# Patient Record
Sex: FEMALE | Race: WHITE | Employment: UNEMPLOYED | URBAN - METROPOLITAN AREA
[De-identification: names, ages, dates, MRNs, and addresses within clinical notes are randomized per-mention and may not be internally consistent; named-entity substitution may affect disease eponyms.]

---

## 2021-02-04 ENCOUNTER — HOSPITAL ENCOUNTER (EMERGENCY)
Age: 36
Discharge: PSYCHIATRIC OR SUBSTANCE USE FACILITY | End: 2021-02-05
Attending: EMERGENCY MEDICINE
Payer: MEDICAID

## 2021-02-04 VITALS
HEART RATE: 124 BPM | OXYGEN SATURATION: 92 % | SYSTOLIC BLOOD PRESSURE: 120 MMHG | TEMPERATURE: 98.1 F | DIASTOLIC BLOOD PRESSURE: 68 MMHG | RESPIRATION RATE: 18 BRPM

## 2021-02-04 DIAGNOSIS — F23 ACUTE PSYCHOSIS (HCC): Primary | ICD-10-CM

## 2021-02-04 DIAGNOSIS — F22 PARANOID DELUSION (HCC): ICD-10-CM

## 2021-02-04 DIAGNOSIS — T74.21XA SEXUAL ASSAULT OF ADULT, INITIAL ENCOUNTER: ICD-10-CM

## 2021-02-04 PROBLEM — F29 PSYCHOSIS (HCC): Status: ACTIVE | Noted: 2021-02-04

## 2021-02-04 LAB
A/G RATIO: 1.5 (ref 1.1–2.2)
ACETAMINOPHEN LEVEL: <5 UG/ML (ref 10–30)
ALBUMIN SERPL-MCNC: 4.7 G/DL (ref 3.4–5)
ALP BLD-CCNC: 74 U/L (ref 40–129)
ALT SERPL-CCNC: 57 U/L (ref 10–40)
AMPHETAMINE SCREEN, URINE: POSITIVE
ANION GAP SERPL CALCULATED.3IONS-SCNC: 13 MMOL/L (ref 3–16)
AST SERPL-CCNC: 56 U/L (ref 15–37)
BARBITURATE SCREEN URINE: ABNORMAL
BASOPHILS ABSOLUTE: 0.1 K/UL (ref 0–0.2)
BASOPHILS RELATIVE PERCENT: 0.8 %
BENZODIAZEPINE SCREEN, URINE: ABNORMAL
BILIRUB SERPL-MCNC: 0.8 MG/DL (ref 0–1)
BILIRUBIN URINE: NEGATIVE
BLOOD, URINE: NEGATIVE
BUN BLDV-MCNC: 19 MG/DL (ref 7–20)
CALCIUM SERPL-MCNC: 9.9 MG/DL (ref 8.3–10.6)
CANNABINOID SCREEN URINE: POSITIVE
CHLORIDE BLD-SCNC: 103 MMOL/L (ref 99–110)
CLARITY: ABNORMAL
CO2: 23 MMOL/L (ref 21–32)
COCAINE METABOLITE SCREEN URINE: ABNORMAL
COLOR: ABNORMAL
CREAT SERPL-MCNC: 0.7 MG/DL (ref 0.6–1.1)
CRYSTALS, UA: ABNORMAL /HPF
EOSINOPHILS ABSOLUTE: 0 K/UL (ref 0–0.6)
EOSINOPHILS RELATIVE PERCENT: 0.4 %
EPITHELIAL CELLS, UA: 2 /HPF (ref 0–5)
ETHANOL: NORMAL MG/DL (ref 0–0.08)
FINE CASTS, UA: ABNORMAL /LPF (ref 0–2)
GFR AFRICAN AMERICAN: >60
GFR NON-AFRICAN AMERICAN: >60
GLOBULIN: 3.1 G/DL
GLUCOSE BLD-MCNC: 99 MG/DL (ref 70–99)
GLUCOSE URINE: NEGATIVE MG/DL
HCG QUALITATIVE: NEGATIVE
HCT VFR BLD CALC: 40.8 % (ref 36–48)
HEMOGLOBIN: 13.9 G/DL (ref 12–16)
HYALINE CASTS: ABNORMAL /LPF (ref 0–2)
KETONES, URINE: 40 MG/DL
LEUKOCYTE ESTERASE, URINE: NEGATIVE
LYMPHOCYTES ABSOLUTE: 1.9 K/UL (ref 1–5.1)
LYMPHOCYTES RELATIVE PERCENT: 21.9 %
Lab: ABNORMAL
MCH RBC QN AUTO: 29.2 PG (ref 26–34)
MCHC RBC AUTO-ENTMCNC: 34.1 G/DL (ref 31–36)
MCV RBC AUTO: 85.7 FL (ref 80–100)
METHADONE SCREEN, URINE: ABNORMAL
MICROSCOPIC EXAMINATION: YES
MONOCYTES ABSOLUTE: 0.9 K/UL (ref 0–1.3)
MONOCYTES RELATIVE PERCENT: 10.5 %
NEUTROPHILS ABSOLUTE: 5.7 K/UL (ref 1.7–7.7)
NEUTROPHILS RELATIVE PERCENT: 66.4 %
NITRITE, URINE: NEGATIVE
OPIATE SCREEN URINE: ABNORMAL
OXYCODONE URINE: ABNORMAL
PDW BLD-RTO: 13.6 % (ref 12.4–15.4)
PH UA: 5.5
PH UA: 5.5 (ref 5–8)
PHENCYCLIDINE SCREEN URINE: ABNORMAL
PLATELET # BLD: 214 K/UL (ref 135–450)
PMV BLD AUTO: 8.4 FL (ref 5–10.5)
POTASSIUM SERPL-SCNC: 4 MMOL/L (ref 3.5–5.1)
PROPOXYPHENE SCREEN: ABNORMAL
PROTEIN UA: 100 MG/DL
RBC # BLD: 4.76 M/UL (ref 4–5.2)
RBC UA: 3 /HPF (ref 0–4)
SALICYLATE, SERUM: <0.3 MG/DL (ref 15–30)
SARS-COV-2, NAAT: NOT DETECTED
SODIUM BLD-SCNC: 139 MMOL/L (ref 136–145)
SPECIFIC GRAVITY UA: 1.03 (ref 1–1.03)
TOTAL PROTEIN: 7.8 G/DL (ref 6.4–8.2)
URINE REFLEX TO CULTURE: ABNORMAL
URINE TYPE: ABNORMAL
UROBILINOGEN, URINE: 0.2 E.U./DL
WBC # BLD: 8.6 K/UL (ref 4–11)
WBC UA: 5 /HPF (ref 0–5)

## 2021-02-04 PROCEDURE — 93005 ELECTROCARDIOGRAM TRACING: CPT | Performed by: EMERGENCY MEDICINE

## 2021-02-04 PROCEDURE — 6360000002 HC RX W HCPCS: Performed by: EMERGENCY MEDICINE

## 2021-02-04 PROCEDURE — 6370000000 HC RX 637 (ALT 250 FOR IP): Performed by: EMERGENCY MEDICINE

## 2021-02-04 PROCEDURE — 80307 DRUG TEST PRSMV CHEM ANLYZR: CPT

## 2021-02-04 PROCEDURE — 2580000003 HC RX 258: Performed by: EMERGENCY MEDICINE

## 2021-02-04 PROCEDURE — 80053 COMPREHEN METABOLIC PANEL: CPT

## 2021-02-04 PROCEDURE — 99283 EMERGENCY DEPT VISIT LOW MDM: CPT

## 2021-02-04 PROCEDURE — 96372 THER/PROPH/DIAG INJ SC/IM: CPT

## 2021-02-04 PROCEDURE — 80179 DRUG ASSAY SALICYLATE: CPT

## 2021-02-04 PROCEDURE — U0002 COVID-19 LAB TEST NON-CDC: HCPCS

## 2021-02-04 PROCEDURE — 82077 ASSAY SPEC XCP UR&BREATH IA: CPT

## 2021-02-04 PROCEDURE — 81001 URINALYSIS AUTO W/SCOPE: CPT

## 2021-02-04 PROCEDURE — 85025 COMPLETE CBC W/AUTO DIFF WBC: CPT

## 2021-02-04 PROCEDURE — U0003 INFECTIOUS AGENT DETECTION BY NUCLEIC ACID (DNA OR RNA); SEVERE ACUTE RESPIRATORY SYNDROME CORONAVIRUS 2 (SARS-COV-2) (CORONAVIRUS DISEASE [COVID-19]), AMPLIFIED PROBE TECHNIQUE, MAKING USE OF HIGH THROUGHPUT TECHNOLOGIES AS DESCRIBED BY CMS-2020-01-R: HCPCS

## 2021-02-04 PROCEDURE — 36415 COLL VENOUS BLD VENIPUNCTURE: CPT

## 2021-02-04 PROCEDURE — 80143 DRUG ASSAY ACETAMINOPHEN: CPT

## 2021-02-04 PROCEDURE — 84703 CHORIONIC GONADOTROPIN ASSAY: CPT

## 2021-02-04 RX ORDER — LORAZEPAM 2 MG/ML
1 INJECTION INTRAMUSCULAR ONCE
Status: DISCONTINUED | OUTPATIENT
Start: 2021-02-04 | End: 2021-02-04

## 2021-02-04 RX ORDER — LORAZEPAM 2 MG/ML
2 INJECTION INTRAMUSCULAR ONCE
Status: COMPLETED | OUTPATIENT
Start: 2021-02-04 | End: 2021-02-04

## 2021-02-04 RX ORDER — ONDANSETRON 4 MG/1
4 TABLET, ORALLY DISINTEGRATING ORAL ONCE
Status: COMPLETED | OUTPATIENT
Start: 2021-02-04 | End: 2021-02-04

## 2021-02-04 RX ORDER — CEFTRIAXONE 1 G/1
500 INJECTION, POWDER, FOR SOLUTION INTRAMUSCULAR; INTRAVENOUS ONCE
Status: DISCONTINUED | OUTPATIENT
Start: 2021-02-04 | End: 2021-02-05 | Stop reason: HOSPADM

## 2021-02-04 RX ORDER — HALOPERIDOL 5 MG/ML
5 INJECTION INTRAMUSCULAR ONCE
Status: COMPLETED | OUTPATIENT
Start: 2021-02-04 | End: 2021-02-05

## 2021-02-04 RX ORDER — ZIPRASIDONE MESYLATE 20 MG/ML
20 INJECTION, POWDER, LYOPHILIZED, FOR SOLUTION INTRAMUSCULAR ONCE
Status: COMPLETED | OUTPATIENT
Start: 2021-02-04 | End: 2021-02-04

## 2021-02-04 RX ORDER — LORAZEPAM 2 MG/ML
1 INJECTION INTRAMUSCULAR ONCE
Status: COMPLETED | OUTPATIENT
Start: 2021-02-04 | End: 2021-02-04

## 2021-02-04 RX ORDER — 0.9 % SODIUM CHLORIDE 0.9 %
1000 INTRAVENOUS SOLUTION INTRAVENOUS ONCE
Status: COMPLETED | OUTPATIENT
Start: 2021-02-04 | End: 2021-02-04

## 2021-02-04 RX ORDER — LEVONORGESTREL 1.5 MG/1
1.5 TABLET ORAL ONCE
Status: COMPLETED | OUTPATIENT
Start: 2021-02-04 | End: 2021-02-04

## 2021-02-04 RX ORDER — AZITHROMYCIN 250 MG/1
1000 TABLET, FILM COATED ORAL ONCE
Status: COMPLETED | OUTPATIENT
Start: 2021-02-04 | End: 2021-02-04

## 2021-02-04 RX ADMIN — SODIUM CHLORIDE 1000 ML: 9 INJECTION, SOLUTION INTRAVENOUS at 12:30

## 2021-02-04 RX ADMIN — LEVONORGESTREL 1.5 MG: 1.5 TABLET ORAL at 22:20

## 2021-02-04 RX ADMIN — LORAZEPAM 2 MG: 2 INJECTION, SOLUTION INTRAMUSCULAR; INTRAVENOUS at 23:53

## 2021-02-04 RX ADMIN — SODIUM CHLORIDE 1000 ML: 9 INJECTION, SOLUTION INTRAVENOUS at 13:15

## 2021-02-04 RX ADMIN — AZITHROMYCIN MONOHYDRATE 500 MG: 250 TABLET ORAL at 22:19

## 2021-02-04 RX ADMIN — LORAZEPAM 1 MG: 2 INJECTION, SOLUTION INTRAMUSCULAR; INTRAVENOUS at 12:30

## 2021-02-04 RX ADMIN — ONDANSETRON 4 MG: 4 TABLET, ORALLY DISINTEGRATING ORAL at 22:20

## 2021-02-04 RX ADMIN — LORAZEPAM 1 MG: 2 INJECTION, SOLUTION INTRAMUSCULAR; INTRAVENOUS at 11:00

## 2021-02-04 RX ADMIN — ZIPRASIDONE MESYLATE 20 MG: 20 INJECTION, POWDER, LYOPHILIZED, FOR SOLUTION INTRAMUSCULAR at 11:16

## 2021-02-04 NOTE — ED NOTES
PT up to restroom. Sitter remains at bedside. PT reminded not to close restroom door.       Ángel Moses RN  02/04/21 7352
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
## 2021-02-04 NOTE — ED NOTES
PT undressed,  1 $20 bill found in undergarment area, 2 $1 bills with what appears to be an illegal substance in a cellophane wrapper and a waffle card found in PT Left bra cup, 1 $1 bill and 2 make up tubes found in PT Right bra cup. PT ambulated to restroom with assistance of myself and Vinayak Mckee. PT sitting on commode falling asleep. PT unable to urinate. PT returned to bed, [facility] for urine specimen. PT tolerated well. Security at bedside to obtain belongings.       Damian Andres RN  02/04/21 4867

## 2021-02-04 NOTE — ED NOTES
Maintaince at bedside, bathroom door unlocked. PD at bedside. PT ambulated to chair refusing to get in bed. PT given 1MG ativan in Right quad. PT continued to refuse to get in bed. PT placed in bed with assistance of security. UE restraints placed on PT. PT attached to cycling monitors.       Kisha Delvalle RN  02/04/21 3829

## 2021-02-04 NOTE — ED NOTES
PT remains asleep, sitter remains at bedside. Meal tray ordered.       Claudetta Congress, RN  02/04/21 2740
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
## 2021-02-04 NOTE — ED NOTES
Contacted patients lisa Rodriguez [phone] she is aware patient is here and Case jovan also notified  brother Carrol Shane.       Pio Beltrán  02/04/21 3456

## 2021-02-04 NOTE — ED NOTES
PT remains in UE soft restraints. PT asleep at this time. Sitter at bedside, PT attached to monitor.   Side rails up Naren Starr, RN  02/04/21 0566
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
## 2021-02-04 NOTE — ED NOTES
PT asking for clothing and to go smoke. Sitter at bedside. Offered PT nicotine patch. PT declined. PT informed TIGIST MAYFIELD contacted.        Get Vaughn RN  02/04/21 8571

## 2021-02-04 NOTE — ED NOTES
PT sleeping in bed, sitter remains at bedside. PIV placed, labs obtained. NS bolus infusing as ordered. PT remains diaphoretic. PT on monitor.         Juanjose Charles RN  02/04/21 9677
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
## 2021-02-04 NOTE — ED NOTES
PT out of bed asking to use restroom. PT locked herself in the bathroom. Security remains at bedside.       Chucky Barney, RN  02/04/21 [address], RN  02/04/21 5552

## 2021-02-05 ENCOUNTER — HOSPITAL ENCOUNTER (INPATIENT)
Age: 36
LOS: 4 days | Discharge: HOME | DRG: 751 | End: 2021-02-09
Attending: PSYCHIATRY & NEUROLOGY | Admitting: PSYCHIATRY & NEUROLOGY
Payer: MEDICAID

## 2021-02-05 PROBLEM — F15.90 AMPHETAMINE USER: Status: ACTIVE | Noted: 2021-02-05

## 2021-02-05 PROBLEM — F11.21 OPIOID USE DISORDER, MODERATE, IN EARLY REMISSION, ON MAINTENANCE THERAPY (HCC): Status: ACTIVE | Noted: 2021-02-05

## 2021-02-05 PROBLEM — R74.01 TRANSAMINITIS: Status: ACTIVE | Noted: 2021-02-05

## 2021-02-05 PROBLEM — F12.20 CANNABIS USE DISORDER, MODERATE, IN CONTROLLED ENVIRONMENT (HCC): Status: ACTIVE | Noted: 2021-02-05

## 2021-02-05 PROBLEM — I10 ESSENTIAL HYPERTENSION: Status: ACTIVE | Noted: 2021-02-05

## 2021-02-05 LAB
EKG ATRIAL RATE: 98 BPM
EKG DIAGNOSIS: NORMAL
EKG P AXIS: 35 DEGREES
EKG P-R INTERVAL: 152 MS
EKG Q-T INTERVAL: 348 MS
EKG QRS DURATION: 82 MS
EKG QTC CALCULATION (BAZETT): 444 MS
EKG R AXIS: -14 DEGREES
EKG T AXIS: 22 DEGREES
EKG VENTRICULAR RATE: 98 BPM

## 2021-02-05 PROCEDURE — 99223 1ST HOSP IP/OBS HIGH 75: CPT | Performed by: PSYCHIATRY & NEUROLOGY

## 2021-02-05 PROCEDURE — 6360000002 HC RX W HCPCS: Performed by: PSYCHIATRY & NEUROLOGY

## 2021-02-05 PROCEDURE — 1240000000 HC EMOTIONAL WELLNESS R&B

## 2021-02-05 PROCEDURE — 6360000002 HC RX W HCPCS: Performed by: EMERGENCY MEDICINE

## 2021-02-05 PROCEDURE — 6370000000 HC RX 637 (ALT 250 FOR IP): Performed by: PSYCHIATRY & NEUROLOGY

## 2021-02-05 PROCEDURE — 93010 ELECTROCARDIOGRAM REPORT: CPT | Performed by: INTERNAL MEDICINE

## 2021-02-05 RX ORDER — BUPRENORPHINE HYDROCHLORIDE AND NALOXONE HYDROCHLORIDE DIHYDRATE 8; 2 MG/1; MG/1
1 TABLET SUBLINGUAL DAILY
Status: DISCONTINUED | OUTPATIENT
Start: 2021-02-05 | End: 2021-02-09 | Stop reason: HOSPADM

## 2021-02-05 RX ORDER — TRAZODONE HYDROCHLORIDE 50 MG/1
50 TABLET ORAL NIGHTLY PRN
Status: DISCONTINUED | OUTPATIENT
Start: 2021-02-05 | End: 2021-02-05

## 2021-02-05 RX ORDER — OLANZAPINE 10 MG/1
10 INJECTION, POWDER, LYOPHILIZED, FOR SOLUTION INTRAMUSCULAR EVERY 12 HOURS PRN
Status: DISCONTINUED | OUTPATIENT
Start: 2021-02-05 | End: 2021-02-09 | Stop reason: HOSPADM

## 2021-02-05 RX ORDER — GABAPENTIN 300 MG/1
300 CAPSULE ORAL 3 TIMES DAILY
Status: DISCONTINUED | OUTPATIENT
Start: 2021-02-05 | End: 2021-02-09 | Stop reason: HOSPADM

## 2021-02-05 RX ORDER — HYDROXYZINE PAMOATE 50 MG/1
50 CAPSULE ORAL 3 TIMES DAILY PRN
Status: DISCONTINUED | OUTPATIENT
Start: 2021-02-05 | End: 2021-02-09 | Stop reason: HOSPADM

## 2021-02-05 RX ORDER — ACETAMINOPHEN 325 MG/1
650 TABLET ORAL EVERY 4 HOURS PRN
Status: DISCONTINUED | OUTPATIENT
Start: 2021-02-05 | End: 2021-02-07

## 2021-02-05 RX ORDER — LORAZEPAM 2 MG/1
2 TABLET ORAL EVERY 8 HOURS PRN
Status: DISCONTINUED | OUTPATIENT
Start: 2021-02-05 | End: 2021-02-05

## 2021-02-05 RX ORDER — OLANZAPINE 5 MG/1
5 TABLET ORAL EVERY 8 HOURS PRN
Status: DISCONTINUED | OUTPATIENT
Start: 2021-02-05 | End: 2021-02-09 | Stop reason: HOSPADM

## 2021-02-05 RX ORDER — ACETAMINOPHEN 325 MG/1
650 TABLET ORAL EVERY 4 HOURS PRN
Status: DISCONTINUED | OUTPATIENT
Start: 2021-02-05 | End: 2021-02-05

## 2021-02-05 RX ORDER — IBUPROFEN 400 MG/1
400 TABLET ORAL EVERY 6 HOURS PRN
Status: DISCONTINUED | OUTPATIENT
Start: 2021-02-05 | End: 2021-02-05

## 2021-02-05 RX ADMIN — GABAPENTIN 300 MG: 300 CAPSULE ORAL at 21:54

## 2021-02-05 RX ADMIN — BUPRENORPHINE HYDROCHLORIDE AND NALOXONE HYDROCHLORIDE DIHYDRATE 1 TABLET: 8; 2 TABLET SUBLINGUAL at 17:46

## 2021-02-05 RX ADMIN — HALOPERIDOL LACTATE 5 MG: 5 INJECTION, SOLUTION INTRAMUSCULAR at 01:04

## 2021-02-05 RX ADMIN — GABAPENTIN 300 MG: 300 CAPSULE ORAL at 17:47
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
## 2021-02-05 NOTE — ED NOTES
Safety precautions in place per psychiatric protocol. Pt undressed and put into gown, belongings removed from room and labeled with pt information. All disinfectants removed, monitor cords removed, phone/phone cord removed, and belongings bags removed. Room check for any potentially hazardous items, all unnecessary items and equipment removed. Patient will be served with plastic eating utensils, all drinks will be placed in styrofoam cups,   at bedside for pt observation and safety. Will continue to monitor every 15 minutes per protocol.      Verna Sanchez RN  02/05/21 3648
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
## 2021-02-05 NOTE — ED NOTES
This writer is charge nurse and was called in by the TIGIST RN to talk with pt. Pt. Is giving TIGIST a hard time with the [facility] ask. Pt. Felipe Whitley stating she wanted to wait for the advocate to come in  and  I explained that they were on their way but to please let the SANE nurse ask the questions so she can get things started. Pt. Talking in circles, has paranoid thoughts- stating that the \"security will not let the  advocate come in because he has already contacted them and told them not to. \"  Pt. Difficult with TIGIST RN  asking if she needed a mental health evaluation. Pt. Yuki Cartagenast that if she continued to give the TIGIST RN a hard time and refuse to cooperate that she will have to leave. Pt. verbalized an understanding.       Jose Dudley RN  02/04/21 4652

## 2021-02-05 NOTE — ED NOTES
Hourly rounding on pt., Pt breathing easy without distress, pt appears comfortable, denies any additional needs or concerns at this time. Psychiatric protocol maintained at this time. Safety precautions in place per psychiatric protocol. Pt undressed and put into gown, belongings removed from room and labeled with pt information. All disinfectants removed, monitor cords removed, phone/phone cord removed, and belongings bags removed. Room check for any potentially hazardous items, all unnecessary items and equipment removed. Patient will be served with plastic eating utensils, all drinks will be placed in styrofoam cups,   at bedside for pt observation and safety. Will continue to monitor every 15 minutes per protocol.      Catarina Oakes RN  02/05/21 0134

## 2021-02-05 NOTE — ED NOTES
Sitter continued at bedside. Pt remains safe in room and no needs expressed at this time. Will continue to monitor.         Noreen Ellis RN  02/05/21 2466
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
## 2021-02-05 NOTE — ED NOTES
Hourly rounding on pt., Pt breathing easy without distress, pt appears comfortable, denies any additional needs or concerns at this time.      Salvador Hebert RN  02/05/21 9136

## 2021-02-05 NOTE — CARE COORDINATION
[facility]  Treatment Team Note  Day 1    Review Date & Time: 0900  2/5/2021    Patient was not in treatment team      Status EXAM:   Status and Exam  Normal: No  Facial Expression: Flat, Worried  Affect: Blunt  Level of Consciousness: Lethargic  Mood:Normal: No  Mood: Depressed  Motor Activity:Normal: No  Motor Activity: Other(See Comments)(kept falling asleep)  Interview Behavior: Cooperative  Preception: [place] to Person, [place] to Time  Attention:Normal: No  Attention: Distractible  Thought Processes: Loose Assoc. Thought Content:Normal: Yes  Hallucinations: None  Delusions: No  Memory:Normal: Yes  Insight and Judgment: No  Insight and Judgment: Poor Judgment, Poor Insight  Present Suicidal Ideation: No  Present Homicidal Ideation: No      Suicide Risk CSSR-S:  1) Within the past month, have you wished you were dead or wished you could go to sleep and not wake up? : No  2) Have you actually had any thoughts of killing yourself? : No  6) Have you ever done anything, started to do anything, or prepared to do anything to end your life?: No      PLAN/TREATMENT RECOMMENDATIONS UPDATE: Patient will take medication as prescribed, eat 75% of meals, attend groups, participate in milieu activities, participate in treatment team and care planning for discharge and follow up.           Slava Reinoso RN

## 2021-02-05 NOTE — ED NOTES
Assumed pt care at time. Sitter continued at bedside. Pt in room safe and sleeping. Will continue to monitor.         Ha Mast RN  02/05/21 4763

## 2021-02-05 NOTE — PLAN OF CARE
Attempted to see patient for H&P. Pt very sleepy, will wake up to verbal stimuli but unable to stay awake to answer questions. Will reassess tomorrow. Discussed with Darline Hills RN.      LAUREN Keen - CNP

## 2021-02-05 NOTE — PROGRESS NOTES
Patient arrived to the [facility] via stretcher with two ambulance EMTs accompany patient. She denied SI/HI upon arrival and was unable to keep her eyes open. She was moved to her bed and settled in for rest. Unable to sign paperwork at this time. oriented to room and bathroom, left light on in bathroom for safe access to bathroom.

## 2021-02-05 NOTE — ED NOTES
Psychiatric protocol initiated at this time. Safety precautions in place per psychiatric protocol. Pt undressed and put into gown, belongings removed from room and labeled with pt information. All disinfectants removed, monitor cords removed, phone/phone cord removed, and belongings bags removed. Room check for any potentially hazardous items, all unnecessary items and equipment removed. Patient will be served with plastic eating utensils, all drinks will be placed in styrofoam cups,   at bedside for pt observation and safety. Will continue to monitor every 15 minutes per protocol.      Roberta Baez RN  02/05/21 4082

## 2021-02-05 NOTE — ED NOTES
Hourly rounding on pt., Pt breathing easy without distress, pt appears comfortable, denies any additional needs or concerns at this time.      Roberta Baez RN  02/05/21 9834
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
## 2021-02-05 NOTE — ED NOTES
PT sitter left. This RN at bedside. PT very anxious at this time.  Sitting on foot of bed     Saadia Palma, RN  02/04/21 [address] RN  02/04/21 5848

## 2021-02-05 NOTE — ED NOTES
Pt updated on POC. Pt positioned for comfort. Call light in reach. Bed locked and in low position. Pt denies any needs or concerns at this time.      Scar Alvarez RN  02/05/21 7692
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
## 2021-02-06 LAB
CHOLESTEROL, TOTAL: 126 MG/DL (ref 0–199)
HDLC SERPL-MCNC: 26 MG/DL (ref 40–60)
LDL CHOLESTEROL CALCULATED: 83 MG/DL
TRIGL SERPL-MCNC: 86 MG/DL (ref 0–150)
TSH SERPL DL<=0.05 MIU/L-ACNC: 0.61 UIU/ML (ref 0.27–4.2)
VLDLC SERPL CALC-MCNC: 17 MG/DL

## 2021-02-06 PROCEDURE — 99232 SBSQ HOSP IP/OBS MODERATE 35: CPT | Performed by: PSYCHIATRY & NEUROLOGY

## 2021-02-06 PROCEDURE — 6370000000 HC RX 637 (ALT 250 FOR IP): Performed by: PSYCHIATRY & NEUROLOGY

## 2021-02-06 PROCEDURE — 1240000000 HC EMOTIONAL WELLNESS R&B

## 2021-02-06 PROCEDURE — 6360000002 HC RX W HCPCS: Performed by: PSYCHIATRY & NEUROLOGY

## 2021-02-06 PROCEDURE — 80061 LIPID PANEL: CPT

## 2021-02-06 PROCEDURE — 36415 COLL VENOUS BLD VENIPUNCTURE: CPT

## 2021-02-06 PROCEDURE — 99222 1ST HOSP IP/OBS MODERATE 55: CPT | Performed by: PHYSICIAN ASSISTANT

## 2021-02-06 PROCEDURE — 83036 HEMOGLOBIN GLYCOSYLATED A1C: CPT

## 2021-02-06 PROCEDURE — 84443 ASSAY THYROID STIM HORMONE: CPT

## 2021-02-06 RX ORDER — RISPERIDONE 1 MG/1
1 TABLET, ORALLY DISINTEGRATING ORAL 2 TIMES DAILY
Status: DISCONTINUED | OUTPATIENT
Start: 2021-02-06 | End: 2021-02-09

## 2021-02-06 RX ADMIN — GABAPENTIN 300 MG: 300 CAPSULE ORAL at 08:29

## 2021-02-06 RX ADMIN — NICOTINE POLACRILEX 2 MG: 2 GUM, CHEWING BUCCAL at 12:34

## 2021-02-06 RX ADMIN — GABAPENTIN 300 MG: 300 CAPSULE ORAL at 13:18

## 2021-02-06 RX ADMIN — BUPRENORPHINE HYDROCHLORIDE AND NALOXONE HYDROCHLORIDE DIHYDRATE 1 TABLET: 8; 2 TABLET SUBLINGUAL at 08:29

## 2021-02-06 RX ADMIN — HYDROXYZINE PAMOATE 50 MG: 50 CAPSULE ORAL at 12:35

## 2021-02-06 ASSESSMENT — PAIN SCALES - GENERAL: PAINLEVEL_OUTOF10: 0
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
## 2021-02-06 NOTE — FLOWSHEET NOTE
02/06/21 1328   Status and Exam   Normal No   Facial Expression Flat   Affect Constricted   Level of Consciousness Lethargic   Mood:Normal No   Mood Depressed; Anxious   Motor Activity:Normal Yes   Interview Behavior Cooperative   Preception [place] to Person;[place] to Situation;[place] to Place;[place] to Time   Attention:Normal No   Attention Distractible   Thought Processes Other(See comment)  (linear)   Thought Content:Normal Yes   Hallucinations None   Delusions No   Memory:Normal Yes   Insight and Judgment No   Insight and Judgment Poor Judgment;Poor Insight   Present Suicidal Ideation No   Present Homicidal Ideation No

## 2021-02-06 NOTE — PROGRESS NOTES
Department of Psychiatry  Attending Progress Note    Admission Date:    2/5/2021    Chief complaint / Reason for Admission:  psychosis    Patient's chart was reviewed, case was discussed with nursing/OT/RT staff, and collaborated with  about the treatment plan. SUBJECTIVE:   Over last 24 hours:  Behavioral outbursts: No   Medication compliant: Yes  Need for seclusion/restraints: No  Sleeping adequately:  Yes  Appetite adequate: Yes  Attending groups: No    Patient kept to herself yesterday. She was evaluated by TIGIST nurse but would only allow her to perform a partial assessment. I spoke to TIGIST a nurse who indicated that no further intervention was indicated. Patient found lying in bed in her room. She is disheveled and guarded. Did appear somewhat sedated, had just received a dose of PRN Vistaril. Voiced general paranoia, indicated that a male staff member was \"giving off a bad vibe\" that made her feel unsafe around him.     Suicidal ideation: denies  Homicidal ideation: denies  Medication side effects: denies    ROS: Patient has new complaints: no    Current Medications Ordered:   risperiDONE  1 mg Oral BID    gabapentin  300 mg Oral TID    buprenorphine-naloxone  1 tablet Sublingual Daily      PRN Meds: nicotine polacrilex, magnesium hydroxide, acetaminophen, OLANZapine, OLANZapine, hydrOXYzine     Objective:     PE:    /76   Pulse 73   Temp 97.3 °F (36.3 °C) (Oral)   Resp 20   SpO2 97%       Motor / Gait: psychomotor retardation, normal gait and station    Mental Status Examination:    Appearance: WF, appears stated age, wearing casual clothing, disheveled grooming and hygiene   Behavior/Attitude toward examiner:  Guarded, poor eye contact  Speech:  Decreased production, monotone  Mood:  \"okay\"  Affect:  Incongruent, flat  Thought processes:  Impoverished, concrete  Thought Content: denies SI, denies HI, + paranoid delusions  Perceptions: denies AVH, not obviously RTIS Attention: impaired  Abstraction: concrete  Cognition: Average BLANCA, Alert and oriented to person, place, time, and situation, recall difficult to assess given limited express thought  Insight: impaired insight   Judgment: impaired judgment      LAB: Reviewed labs from last 24 hours      Dx:   Primary Psychiatric (DSM V) Diagnosis: unspecified psychotic disorder    All conditions detailed above are being treated while patient is hospitalized. Tx plan: Generally: prevent self injury/aggression towards others, stabilize mood/anxiety/psychotic/behavioral disturbance, establish/maintain aftercare, increase coping mechanisms, improve medication compliance. All conditions present on admission are being treated while pt is hospitalized. Primary Psychiatric Issues:   1. Nursing staff was able to verify several home medications. It appears as though patient has been prescribed risperidone as well as Sonjia Toño. Unclear when her last injection was administered. Will start risperidone 1 mg twice a day. Patient's symptoms show no change    Chemical Dependency Issues:  Monitor. No interventions indicated for methamphetamine withdrawal. Discuss drug treatment one psychosis has improved. Medical Problems:  Internal medicine has been consulted. Appreciate recs. Disposition:    -Discharge planning is incomplete    Criteria for Discharge:  Not suicidal, not homicidal, not grossly psychotic, behavioral disturbance improved, sleeping well, mood/affect stable, eating well, aftercare arranged. Total face to face time with patient was 30 minutes and more than 50 % of that time was spent counseling the patient on their symptoms, treatment and expected goals.     Beba Hawley MD  Staff Psychiatrist

## 2021-02-06 NOTE — PLAN OF CARE
Problem: Altered Mood, Psychotic Behavior:  Goal: Absence of self-harm  Description: Absence of self-harm  Outcome: Ongoing     Problem: Altered Mood, Psychotic Behavior:  Goal: Able to verbalize decrease in frequency and intensity of hallucinations  Description: Able to verbalize decrease in frequency and intensity of hallucinations  Outcome: Ongoing   Patient denies A/V/H but appears distracted. Patient isolative to room and self. Displaying safe behaviors on unit. Patient compliant with medication. Out of room for snack. Did not attend group tonight.

## 2021-02-06 NOTE — PLAN OF CARE
Problem: Altered Mood, Psychotic Behavior:  Goal: Absence of self-harm  Description: Absence of self-harm  Outcome: Met This Shift     Problem: Altered Mood, Psychotic Behavior:  Goal: Able to demonstrate trust by eating, participating in treatment and following staff's direction  Description: Able to demonstrate trust by eating, participating in treatment and following staff's direction  Outcome: Ongoing     Problem: Altered Mood, Psychotic Behavior:  Goal: Able to verbalize decrease in frequency and intensity of hallucinations  Description: Able to verbalize decrease in frequency and intensity of hallucinations  Outcome: Ongoing     Problem: Altered Mood, Psychotic Behavior:  Goal: Able to verbalize reality based thinking  Description: Able to verbalize reality based thinking  Outcome: Ongoing     Problem: Altered Mood, Psychotic Behavior:  Goal: Ability to achieve adequate nutritional intake will improve  Description: Ability to achieve adequate nutritional intake will improve  Outcome: Ongoing

## 2021-02-07 LAB
ESTIMATED AVERAGE GLUCOSE: 99.7 MG/DL
HBA1C MFR BLD: 5.1 %

## 2021-02-07 PROCEDURE — 6370000000 HC RX 637 (ALT 250 FOR IP): Performed by: PSYCHIATRY & NEUROLOGY

## 2021-02-07 PROCEDURE — 1240000000 HC EMOTIONAL WELLNESS R&B

## 2021-02-07 PROCEDURE — 6360000002 HC RX W HCPCS: Performed by: PSYCHIATRY & NEUROLOGY

## 2021-02-07 RX ORDER — IBUPROFEN 400 MG/1
400 TABLET ORAL EVERY 6 HOURS PRN
Status: DISCONTINUED | OUTPATIENT
Start: 2021-02-07 | End: 2021-02-09 | Stop reason: HOSPADM

## 2021-02-07 RX ORDER — MAGNESIUM HYDROXIDE/ALUMINUM HYDROXICE/SIMETHICONE 120; 1200; 1200 MG/30ML; MG/30ML; MG/30ML
30 SUSPENSION ORAL EVERY 6 HOURS PRN
Status: DISCONTINUED | OUTPATIENT
Start: 2021-02-07 | End: 2021-02-09 | Stop reason: HOSPADM

## 2021-02-07 RX ADMIN — GABAPENTIN 300 MG: 300 CAPSULE ORAL at 08:31

## 2021-02-07 RX ADMIN — IBUPROFEN 400 MG: 400 TABLET, FILM COATED ORAL at 08:50

## 2021-02-07 RX ADMIN — GABAPENTIN 300 MG: 300 CAPSULE ORAL at 22:45

## 2021-02-07 RX ADMIN — BUPRENORPHINE HYDROCHLORIDE AND NALOXONE HYDROCHLORIDE DIHYDRATE 1 TABLET: 8; 2 TABLET SUBLINGUAL at 08:29

## 2021-02-07 RX ADMIN — NICOTINE POLACRILEX 2 MG: 2 GUM, CHEWING BUCCAL at 12:26

## 2021-02-07 RX ADMIN — NICOTINE POLACRILEX 2 MG: 2 GUM, CHEWING BUCCAL at 08:29

## 2021-02-07 RX ADMIN — ALUMINUM HYDROXIDE, MAGNESIUM HYDROXIDE, AND SIMETHICONE 30 ML: 200; 200; 20 SUSPENSION ORAL at 16:29

## 2021-02-07 RX ADMIN — GABAPENTIN 300 MG: 300 CAPSULE ORAL at 14:09

## 2021-02-07 RX ADMIN — ALUMINUM HYDROXIDE, MAGNESIUM HYDROXIDE, AND SIMETHICONE 30 ML: 200; 200; 20 SUSPENSION ORAL at 19:05

## 2021-02-07 ASSESSMENT — PAIN SCALES - GENERAL
PAINLEVEL_OUTOF10: 1
PAINLEVEL_OUTOF10: 3

## 2021-02-07 NOTE — PLAN OF CARE
Problem: Altered Mood, Psychotic Behavior:  Goal: Absence of self-harm  Description: Absence of self-harm  2/6/2021 2346 by Tammy Martinez RN  Outcome: Ongoing   Patient denies all. Isolative to room and self. Did not attend group. Refused Risperdal. Oriented x 3.

## 2021-02-07 NOTE — BH NOTE
Pt refusing Risperdal, stating that, \"it makes me forget stuff. \"  Pt indicated that Sandi Velazquez had been successful in the past, adding, \"it was like taking nothing at all, but I only got it once. \"  Pt indicated that she had no intention of continuing with the [facility] after discharge if it were to be initiated.

## 2021-02-07 NOTE — GROUP NOTE
Group Therapy Note    Date: 2/7/2021    Group Start Time: [address] Time: 1200  Group Topic: Recreational    [address]        Group Therapy Note    Attendees: 14         Patient's Goal: Pt to engage in group activity. Pt to be appropriate and respectful with talking and engaging others    Notes:  Pt was engaged in group and was able to offer insight. Pt was appropriate. Status After Intervention:  Improved    Participation Level:  Active Listener    Participation Quality: Appropriate and Attentive      Speech:  normal      Thought Process/Content: Logical      Affective Functioning: Congruent      Mood: anxious      Level of consciousness:  Alert and Oriented x4      Response to Learning: Able to verbalize current knowledge/experience and Capable of insight      Endings: None Reported    Modes of Intervention: Activity      Discipline Responsible: /Counselor      Signature:  Cindy Villavicencio

## 2021-02-07 NOTE — PLAN OF CARE
Problem: Altered Mood, Psychotic Behavior:  Goal: Able to demonstrate trust by eating, participating in treatment and following staff's direction  Description: Able to demonstrate trust by eating, participating in treatment and following staff's direction  Outcome: Met This Shift     Problem: Altered Mood, Psychotic Behavior:  Goal: Absence of self-harm  Description: Absence of self-harm  2/7/2021 0941 by Tobias Elizabeth RN  Outcome: Met This Shift     Problem: Altered Mood, Psychotic Behavior:  Goal: Ability to achieve adequate nutritional intake will improve  Description: Ability to achieve adequate nutritional intake will improve  Outcome: Met This Shift     Problem: Altered Mood, Psychotic Behavior:  Goal: Ability to interact with others will improve  Description: Ability to interact with others will improve  Outcome: Met This Shift     Problem: Altered Mood, Psychotic Behavior:  Goal: Able to verbalize decrease in frequency and intensity of hallucinations  Description: Able to verbalize decrease in frequency and intensity of hallucinations  Outcome: Ongoing     Problem: Altered Mood, Psychotic Behavior:  Goal: Able to verbalize reality based thinking  Description: Able to verbalize reality based thinking  Outcome: Ongoing

## 2021-02-07 NOTE — GROUP NOTE
Group Therapy Note    Date: 2/7/2021    Group Start Time: 1330  Group End Time: 1430  Group Topic: Cognitive Skills    [address] [facility], [place], [facility], [address]      Group Therapy Note    Attendees: 9         Patient's Goal:  Pt to learn the type of boundaries and traits of healthy vs unhealthy boundaries. Pt to learn health communication strategies to set/clarify boundaries with others. Notes:  Pt actively participated in group discussion/activity. Status After Intervention:  Improved    Participation Level:  Active Listener and Interactive    Participation Quality: Appropriate, Attentive, Sharing and Supportive      Speech:  normal      Thought Process/Content: Logical      Affective Functioning: Congruent      Mood: euthymic      Level of consciousness:  Alert, Oriented x4 and Attentive      Response to Learning: Able to verbalize current knowledge/experience, Able to verbalize/acknowledge new learning, Able to retain information, Capable of insight, Able to change behavior and Progressing to goal      Endings: None Reported    Modes of Intervention: Education, Support, Socialization, Exploration, Clarifying, Problem-solving, Confrontation, Limit-setting and Reality-testing      Discipline Responsible: /Counselor      Signature:  Lorre Rings, [place], [place]

## 2021-02-08 PROCEDURE — 99233 SBSQ HOSP IP/OBS HIGH 50: CPT | Performed by: PSYCHIATRY & NEUROLOGY

## 2021-02-08 PROCEDURE — 97165 OT EVAL LOW COMPLEX 30 MIN: CPT

## 2021-02-08 PROCEDURE — 97535 SELF CARE MNGMENT TRAINING: CPT

## 2021-02-08 PROCEDURE — 6360000002 HC RX W HCPCS: Performed by: PSYCHIATRY & NEUROLOGY

## 2021-02-08 PROCEDURE — 1240000000 HC EMOTIONAL WELLNESS R&B

## 2021-02-08 PROCEDURE — 6370000000 HC RX 637 (ALT 250 FOR IP): Performed by: PSYCHIATRY & NEUROLOGY

## 2021-02-08 RX ORDER — DIMETHICONE, OXYBENZONE, AND PADIMATE O 2; 2.5; 6.6 G/100G; G/100G; G/100G
STICK TOPICAL PRN
Status: DISCONTINUED | OUTPATIENT
Start: 2021-02-08 | End: 2021-02-09 | Stop reason: HOSPADM

## 2021-02-08 RX ADMIN — BUPRENORPHINE HYDROCHLORIDE AND NALOXONE HYDROCHLORIDE DIHYDRATE 1 TABLET: 8; 2 TABLET SUBLINGUAL at 08:34

## 2021-02-08 RX ADMIN — GABAPENTIN 300 MG: 300 CAPSULE ORAL at 12:36

## 2021-02-08 RX ADMIN — GABAPENTIN 300 MG: 300 CAPSULE ORAL at 21:16

## 2021-02-08 RX ADMIN — Medication: at 08:55

## 2021-02-08 RX ADMIN — GABAPENTIN 300 MG: 300 CAPSULE ORAL at 08:34

## 2021-02-08 ASSESSMENT — PAIN SCALES - GENERAL: PAINLEVEL_OUTOF10: 0

## 2021-02-08 NOTE — PROGRESS NOTES
Patient declined her 21:00 scheduled risperdal- m tab 1 mg. \"I don't think I need it. I also was on it before & it made me foggy. \" Joey Pisano R.N.
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
## 2021-02-08 NOTE — FLOWSHEET NOTE
Group Therapy Note    Date: 2/8/2021  Start Time: 1300  End Time:  7932  Number of Participants: 12    Type of Group: Mormonism Service    Notes:  Pt present for most of Mormonism Service. While present, Pt actively participated and shared. Pt left after 30 minutes in group and did not return. Participation Level:  Active Listener and Interactive    Participation Quality: Appropriate, Attentive and Sharing      Speech:  normal      Affective Functioning: Congruent      Endings: None Reported    Modes of Intervention: Support, Socialization and Exploration      Discipline Responsible: Chaplain Jeanine Harrison       02/08/21 5891   Encounter Summary   Services provided to: Patient   Continue Visiting   (2/8 Mormonism Service)   Complexity of Encounter Moderate   Length of Encounter 30 minutes

## 2021-02-08 NOTE — FLOWSHEET NOTE
02/08/21 0948   Status and Exam   Normal No   Facial Expression Flat   Affect Constricted   Level of Consciousness Alert   Mood:Normal No   Mood Depressed   Motor Activity:Normal No   Motor Activity Decreased   Interview Behavior Cooperative   Preception [place] to Person;[place] to Time;[place] to Place;[place] to Situation   Attention:Normal No   Attention Distractible   Thought Processes Circumstantial   Thought Content:Normal Yes   Hallucinations   (Denies)   Delusions No   Memory:Normal Yes   Insight and Judgment No   Insight and Judgment Poor Insight;Poor Judgment   Present Suicidal Ideation No   Present Homicidal Ideation No

## 2021-02-08 NOTE — BH NOTE
Writer spoke with pt's [facility]  Florencia Tamez [phone] who shared that she is struggling to work with patient and how to help her. She shared that pt has continued to be reluctant toward services and taking her prescribed medication/injection. She shared that pt is also working with [facility] in [facility] and is inconsistent with treatment there as well. She inquired about possible probate status for pt and writer shared that pt is not exhibiting any unsafe behavior while on the unit and would most likely be discharging tomorrow. CM acknowledged this and thanked writer. Writer encouraged  to reach out to pt's  and other community resources as well as their county probate court to determine what steps could be taken in the community in the event of pt's continued non-compliance with recommended treatment.     Asim Hall MSW, LSW
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
## 2021-02-09 VITALS
RESPIRATION RATE: 16 BRPM | TEMPERATURE: 97.3 F | DIASTOLIC BLOOD PRESSURE: 81 MMHG | HEART RATE: 66 BPM | OXYGEN SATURATION: 99 % | SYSTOLIC BLOOD PRESSURE: 120 MMHG

## 2021-02-09 PROCEDURE — 6370000000 HC RX 637 (ALT 250 FOR IP): Performed by: PSYCHIATRY & NEUROLOGY

## 2021-02-09 PROCEDURE — 99239 HOSP IP/OBS DSCHRG MGMT >30: CPT | Performed by: PSYCHIATRY & NEUROLOGY

## 2021-02-09 PROCEDURE — 6360000002 HC RX W HCPCS: Performed by: PSYCHIATRY & NEUROLOGY

## 2021-02-09 PROCEDURE — 5130000000 HC BRIDGE APPOINTMENT

## 2021-02-09 RX ADMIN — BUPRENORPHINE HYDROCHLORIDE AND NALOXONE HYDROCHLORIDE DIHYDRATE 1 TABLET: 8; 2 TABLET SUBLINGUAL at 09:10

## 2021-02-09 RX ADMIN — GABAPENTIN 300 MG: 300 CAPSULE ORAL at 09:10

## 2021-02-09 RX ADMIN — NICOTINE POLACRILEX 2 MG: 2 GUM, CHEWING BUCCAL at 11:14

## 2021-02-09 RX ADMIN — GABAPENTIN 300 MG: 300 CAPSULE ORAL at 13:40

## 2021-02-09 ASSESSMENT — PAIN SCALES - GENERAL: PAINLEVEL_OUTOF10: 0

## 2021-02-09 NOTE — PLAN OF CARE
Problem: Altered Mood, Psychotic Behavior:  Goal: Able to verbalize decrease in frequency and intensity of hallucinations  Description: Able to verbalize decrease in frequency and intensity of hallucinations  2/9/2021 0914 by Suman Perez RN  Outcome: Ongoing   Patient briefly visible in the milieu this morning for breakfast and then retreated to room and bed. She reports good sleep last night. She denies any thoughts of harming self or others. She denies any racing or intrusive thoughts. She denies any hallucinations. She has been compliant with taking meds, except refused her risperdal. Pt states, \"It makes me foggy, I can't think clearly, and it makes my body twitch. \" She has been cooperative with staff. Able to verbalize needs. Will continue to monitor for safety.

## 2021-02-09 NOTE — PLAN OF CARE
Problem: Altered Mood, Psychotic Behavior:  Goal: Able to demonstrate trust by eating, participating in treatment and following staff's direction  Description: Able to demonstrate trust by eating, participating in treatment and following staff's direction  Outcome: Ongoing  Goal: Able to verbalize decrease in frequency and intensity of hallucinations  Description: Able to verbalize decrease in frequency and intensity of hallucinations  Outcome: Ongoing  Goal: Able to verbalize reality based thinking  Description: Able to verbalize reality based thinking  Outcome: Ongoing  Goal: Absence of self-harm  Description: Absence of self-harm  Outcome: Ongoing  Goal: Ability to achieve adequate nutritional intake will improve  Description: Ability to achieve adequate nutritional intake will improve  Outcome: Ongoing  Goal: Ability to interact with others will improve  Description: Ability to interact with others will improve  Outcome: Ongoing  Goal: Compliance with prescribed medication regimen will improve  Description: Compliance with prescribed medication regimen will improve  Outcome: Ongoing   Problem: Pain:  Goal: Pain level will decrease  Description: Pain level will decrease  Outcome: Ongoing  Goal: Control of acute pain  Description: Control of acute pain  Outcome: Ongoing  Goal: Control of chronic pain  Description: Control of chronic pain  Outcome: Ongoing   Patient denied SI/HI,A/V hallucinations. Patient is hoping to be discharged from the hospital soon. She denied any needs for discharge. Patient refused Risperdal this evening, said that it makes her too foggy. Encouraged patient to discuss with her psychiatrist why she doesn't want to be on Risperdal. She denied any complaints this evening.

## 2021-02-09 NOTE — SUICIDE SAFETY PLAN
SAFETY PLAN    A suicide Safety Plan is a document that supports someone when they are having thoughts of suicide. Warning Signs that indicate a suicidal crisis may be developing: What (situations, thoughts, feelings, body sensations, behaviors, etc.) do you experience that lets you know you are beginning to think about suicide? 1. Change in mood  2. Feelings of being overwhelmed    Internal Coping Strategies:  What things can I do (relaxation techniques, hobbies, physical activities, etc.) to take my mind off my problems without contacting another person? 1. breathing  2. Playing games on phone  3. Talking to somebody    People and social settings that provide distraction: Who can I call or where can I go to distract me? 1. Name: Fleta Rubinstein    2. Name: Lise Cruz   3. Place: [facility]            4. Place: [address] [place] [address] whom I can ask for help: Who can I call when I need help - for example, friends, family, clergy, someone else? 1. Name: Fleta Rubinstein                  2. Name: Lise Cruz   3. Name: Ignacia Queen or [address] [place] [address] I can contact during a crisis: Who can I call for help - for example, my doctor, my psychiatrist, my psychologist, a mental health provider, a suicide hotline? 1. Clinician Name: [address] [place] [address]   Address: [address] [place], [facility]      Phone  #: [phone]    2. Suicide Prevention Lifeline: 8-511-133-TALK (2996)    Making the environment safe: How can I make my environment (house/apartment/living space) safer? For example, can I remove guns, medications, and other items? 1. Remove sharp objects  2.  Put all weapons up    Something that is important to me and worth living for is: for my children

## 2021-02-09 NOTE — BH NOTE
Writer called pt's [employer]  Ryan Henry [phone] to update her on pt's discharge and aftercare plan. A VM was left with information and request for call back.     Ride ordered with [facility] [employer] - Trip ID#: [id number]    Evelyn BOSWELL, MARVAW
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
## 2021-02-09 NOTE — BH NOTE
Group Therapy Note    Date: 2/8/2021  Start Time: 2045  End Time:  2100  Number of Participants: 12    Type of Group: Wrap-Up    Wellness Binder Information  Module Name:    Session Number:      Patient's Goal:  Go home tomorrow (first group)    Notes:      Status After Intervention:  Unchanged    Participation Level: Minimal    Participation Quality: Appropriate      Speech:  normal      Thought Process/Content: Logical      Affective Functioning: Congruent      Mood: anxious      Level of consciousness:  Alert and Oriented x4      Response to Learning: Able to verbalize current knowledge/experience      Endings: None Reported    Modes of Intervention: Education and Support      Discipline Responsible: Registered Nurse      Signature:  Dane Lozano RN
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
